# Patient Record
Sex: MALE
[De-identification: names, ages, dates, MRNs, and addresses within clinical notes are randomized per-mention and may not be internally consistent; named-entity substitution may affect disease eponyms.]

---

## 2022-12-07 ENCOUNTER — APPOINTMENT (OUTPATIENT)
Dept: ORTHOPEDIC SURGERY | Facility: CLINIC | Age: 67
End: 2022-12-07

## 2022-12-07 VITALS — HEIGHT: 69 IN | WEIGHT: 195 LBS | BODY MASS INDEX: 28.88 KG/M2

## 2022-12-07 DIAGNOSIS — I10 ESSENTIAL (PRIMARY) HYPERTENSION: ICD-10-CM

## 2022-12-07 DIAGNOSIS — R56.9 UNSPECIFIED CONVULSIONS: ICD-10-CM

## 2022-12-07 DIAGNOSIS — Z87.19 PERSONAL HISTORY OF OTHER DISEASES OF THE DIGESTIVE SYSTEM: ICD-10-CM

## 2022-12-07 PROBLEM — Z00.00 ENCOUNTER FOR PREVENTIVE HEALTH EXAMINATION: Status: ACTIVE | Noted: 2022-12-07

## 2022-12-07 PROCEDURE — 99204 OFFICE O/P NEW MOD 45 MIN: CPT | Mod: 57

## 2022-12-07 PROCEDURE — 26600 TREAT METACARPAL FRACTURE: CPT | Mod: LT

## 2022-12-07 RX ORDER — ASPIRIN 325 MG/1
TABLET, FILM COATED ORAL
Refills: 0 | Status: ACTIVE | COMMUNITY

## 2022-12-07 RX ORDER — OXCARBAZEPINE 60 MG/ML
SUSPENSION ORAL
Refills: 0 | Status: ACTIVE | COMMUNITY

## 2022-12-07 NOTE — HISTORY OF PRESENT ILLNESS
[Localized] : localized [de-identified] : 12/7/22:  Pt was walking and fell and hurt left hand\par RHD [] : no [FreeTextEntry1] : left hand  [FreeTextEntry3] : 12/5/22 [FreeTextEntry4] : 9 am  [FreeTextEntry5] : patient fell outside while taking his daily walk  [de-identified] : xrays

## 2022-12-07 NOTE — ASSESSMENT
[FreeTextEntry1] : The fracture was discussed with the patient at length.  We discussed that although there may be some imperfect alignment on XRay, the patient would likely do best with early motion exercises to minimize stiffness.  We discussed that flexion at the fracture site was well tolerated.  We discussed the importance of good function over good Xrays and that performing surgery may lead to unnecessary scar tissue formation.  We discussed the benefit of kathleen strapping and early range of motion.  We did discuss the goals of surgery when indicated for malrotation or extreme flexion and what to expect.  The patient may benefit from therapy.  Risks of treatment include, but are not limited to persistent pain, stiffness, fracture displacement, need for future surgery, mal-union, non-union. All patient questions were answered. Patient expressed understanding and would like to proceed with the non operative treatment plan.\par \par The affected finger is noted to be clean and dry.  A kathleen loop was applied to the affected finger over the middle phalanx. It was then strapped to the adjacent finger in overlapping fashion. Fit and pressure were assessed as the strapping was applied and stopped when the desired amount of support was achieved\par \par Pt was informed that he may have some stiffness due to the underlying arthritis.\par

## 2022-12-07 NOTE — DATA REVIEWED
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

## 2022-12-07 NOTE — IMAGING
[de-identified] : left hand:\par swelling and ecchymosis\par ttp over base of 5th MC\par rom limited due to stiffness\par normal cascade\par nvid

## 2023-01-04 ENCOUNTER — APPOINTMENT (OUTPATIENT)
Dept: ORTHOPEDIC SURGERY | Facility: CLINIC | Age: 68
End: 2023-01-04
Payer: MEDICARE

## 2023-01-04 VITALS — WEIGHT: 195 LBS | BODY MASS INDEX: 28.88 KG/M2 | HEIGHT: 69 IN

## 2023-01-04 PROCEDURE — 73130 X-RAY EXAM OF HAND: CPT | Mod: LT

## 2023-01-04 PROCEDURE — 29280 STRAPPING OF HAND OR FINGER: CPT | Mod: 58,LT

## 2023-01-04 PROCEDURE — 99024 POSTOP FOLLOW-UP VISIT: CPT

## 2023-02-01 ENCOUNTER — APPOINTMENT (OUTPATIENT)
Dept: ORTHOPEDIC SURGERY | Facility: CLINIC | Age: 68
End: 2023-02-01
Payer: MEDICARE

## 2023-02-01 VITALS — HEIGHT: 69 IN | WEIGHT: 195 LBS | BODY MASS INDEX: 28.88 KG/M2

## 2023-02-01 DIAGNOSIS — S62.347A NONDISPLACED FRACTURE OF BASE OF FIFTH METACARPAL BONE, LEFT HAND, INITIAL ENCOUNTER FOR CLOSED FRACTURE: ICD-10-CM

## 2023-02-01 PROCEDURE — 73130 X-RAY EXAM OF HAND: CPT | Mod: LT

## 2023-02-01 PROCEDURE — 99024 POSTOP FOLLOW-UP VISIT: CPT

## 2023-02-01 NOTE — ASSESSMENT
[FreeTextEntry1] : Pt is provided new kathleen loops for use  x 3 more weeks (as he has lost 2 of the previous loops provided) and rto in that time for final xray and examination. \par HEP discussed.

## 2023-02-01 NOTE — HISTORY OF PRESENT ILLNESS
[Localized] : localized [de-identified] : 1/4/2022: Pt here for 4 week f/u s/p left 5th MC fracture. Pt has maintained kathleen loops as instructed.\par Pt reports that he has no pain. \par \par 12/7/22:  Pt was walking and fell and hurt left hand\par RHD [] : no [FreeTextEntry1] : left hand  [FreeTextEntry3] : 12/5/22 [FreeTextEntry4] : 9 am  [FreeTextEntry5] : patient fell outside while taking his daily walk  [de-identified] : xrays

## 2023-02-01 NOTE — IMAGING
[Left] : left hand [de-identified] : left hand:\par swelling and ecchymosis has resolved\par There is no ttp over base of 5th MC\par rom is full\par normal cascade\par nvid\par Left wrist with full and painfree ROM with no ttp noted.  [FreeTextEntry1] : there is no change in fracture alignment with moderate callous formation.

## 2023-02-01 NOTE — HISTORY OF PRESENT ILLNESS
[0] : 0 [Localized] : localized [de-identified] : 2/1/23: farom, no pain\par \par 1/4/2022: Pt here for 4 week f/u s/p left 5th MC fracture. Pt has maintained kathleen loops as instructed.\par Pt reports that he has no pain. \par \par 12/7/22:  Pt was walking and fell and hurt left hand\par RHD [] : no [FreeTextEntry1] : left hand  [FreeTextEntry3] : 12/5/22 [FreeTextEntry4] : 9 am  [FreeTextEntry5] : patient fell outside while taking his daily walk  [de-identified] : xrays

## 2023-02-01 NOTE — IMAGING
[Left] : left hand [de-identified] : left hand:\par no swelling or ecchymosis \par There is no ttp over base of 5th MC\par rom is full\par normal cascade\par nvid\par Left wrist with full and painfree ROM with no ttp noted.  [FreeTextEntry1] : healed fracture with callous formation.